# Patient Record
Sex: MALE | URBAN - METROPOLITAN AREA
[De-identification: names, ages, dates, MRNs, and addresses within clinical notes are randomized per-mention and may not be internally consistent; named-entity substitution may affect disease eponyms.]

---

## 2023-10-24 ENCOUNTER — OFFICE VISIT (OUTPATIENT)
Dept: URBAN - METROPOLITAN AREA CLINIC 98 | Facility: CLINIC | Age: 33
End: 2023-10-24
Payer: COMMERCIAL

## 2023-10-24 ENCOUNTER — LAB OUTSIDE AN ENCOUNTER (OUTPATIENT)
Dept: URBAN - METROPOLITAN AREA CLINIC 98 | Facility: CLINIC | Age: 33
End: 2023-10-24

## 2023-10-24 VITALS
TEMPERATURE: 97.7 F | SYSTOLIC BLOOD PRESSURE: 124 MMHG | WEIGHT: 169.6 LBS | DIASTOLIC BLOOD PRESSURE: 79 MMHG | BODY MASS INDEX: 27.26 KG/M2 | HEART RATE: 69 BPM | HEIGHT: 66 IN

## 2023-10-24 DIAGNOSIS — R93.3 ABNORMAL CT SCAN, COLON: ICD-10-CM

## 2023-10-24 PROCEDURE — 99203 OFFICE O/P NEW LOW 30 MIN: CPT | Performed by: INTERNAL MEDICINE

## 2023-10-24 PROCEDURE — 99243 OFF/OP CNSLTJ NEW/EST LOW 30: CPT | Performed by: INTERNAL MEDICINE

## 2023-10-24 RX ORDER — LACTOBACIL 2/BIFIDO 1/S.THERMO 450B CELL
ONE CAPSULE PACKET (EA) ORAL TWICE A DAY
Qty: 60 CAPSULE | Refills: 3 | OUTPATIENT
Start: 2023-10-24 | End: 2024-02-20

## 2023-10-24 RX ORDER — SODIUM, POTASSIUM,MAG SULFATES 17.5-3.13G
354ML SOLUTION, RECONSTITUTED, ORAL ORAL
Qty: 345 MILLILITER | Refills: 0 | OUTPATIENT
Start: 2023-10-24 | End: 2023-10-25

## 2023-10-24 NOTE — HPI-TODAY'S VISIT:
Patient  referred by Cam Chu MD for evaluation of abdominal pain. Copy of this consult OV sent to Dr. Chu. 32 yo pt, originally from [facility], who presented to a local Urgent Care for evaluation of abdominal pain: Dx' w diverticulitis (no labs or imaging done at the time), and Rx'd w Cipro / Flagyl / Augmentin x 14 days. Sxs improved for some time, but abdominal pain return: seen in the ER @ [facility]: A + P CT: L-sided colitis and + CDI, Rx'd w Vanco x 14 days, w relapse a week later --> Vanco x 21 days, which he finished 4 mos ago. Today, having normal formed bm's w some urgency wo bleeding while he's on a healthy diet. Whenever he goes off his diet, he has non-bloody, diarrhea w colicky abdominal pain. No fever, chills, anorexia, weight loss and no constitutional sxs.  Some weight loss lately due to decreased po intake. Denies cardiorespiratory or urologic sxs. No other complaints. He has no FHx IBD / CC / pp / GI malignancies. No recent travel.

## 2023-10-25 LAB
C-REACTIVE PROTEIN, QUANT: <1
FERRITIN, SERUM: 255
IRON BIND.CAP.(TIBC): 375
IRON SATURATION: 26
IRON: 97
UIBC: 278

## 2023-10-26 ENCOUNTER — DASHBOARD ENCOUNTERS (OUTPATIENT)
Age: 33
End: 2023-10-26

## 2023-10-27 ENCOUNTER — TELEPHONE ENCOUNTER (OUTPATIENT)
Dept: URBAN - METROPOLITAN AREA CLINIC 98 | Facility: CLINIC | Age: 33
End: 2023-10-27

## 2023-10-28 LAB — CALPROTECTIN, FECAL: 53

## 2023-10-30 ENCOUNTER — OFFICE VISIT (OUTPATIENT)
Dept: URBAN - METROPOLITAN AREA SURGERY CENTER 18 | Facility: SURGERY CENTER | Age: 33
End: 2023-10-30
Payer: COMMERCIAL

## 2023-10-30 DIAGNOSIS — K58.9 SPASM OF COLON: ICD-10-CM

## 2023-10-30 DIAGNOSIS — K57.30 DIVERTICULOSIS OF SIGMOID COLON: ICD-10-CM

## 2023-10-30 DIAGNOSIS — Z09 CARDIOLOGY FOLLOW-UP ENCOUNTER: ICD-10-CM

## 2023-10-30 DIAGNOSIS — Z87.19 H/O DIVERTICULITIS OF COLON: ICD-10-CM

## 2023-10-30 PROCEDURE — G8907 PT DOC NO EVENTS ON DISCHARG: HCPCS | Performed by: INTERNAL MEDICINE

## 2023-10-30 PROCEDURE — 00811 ANES LWR INTST NDSC NOS: CPT | Performed by: STUDENT IN AN ORGANIZED HEALTH CARE EDUCATION/TRAINING PROGRAM

## 2023-10-30 PROCEDURE — 45378 DIAGNOSTIC COLONOSCOPY: CPT | Performed by: INTERNAL MEDICINE

## 2023-11-29 ENCOUNTER — OFFICE VISIT (OUTPATIENT)
Dept: URBAN - METROPOLITAN AREA CLINIC 98 | Facility: CLINIC | Age: 33
End: 2023-11-29